# Patient Record
(demographics unavailable — no encounter records)

---

## 2025-01-18 NOTE — DISCUSSION/SUMMARY

## 2025-01-18 NOTE — END OF VISIT
[FreeTextEntry3] : Documented by Saadia Thacker acting as a scribe for Marleni Woods on 01/15/2025   All medical record entries made by the Scribe were at my, Dr. Marleni Woods direction and personally dictated by me on 01/15/2025 . I have reviewed the chart and agree that the record accurately reflects my personal performance of the history, physical exam, assessment and plan. I have also personally directed, reviewed, and agreed with the chart. [Time Spent: ___ minutes] : I have spent [unfilled] minutes of time on the encounter which excludes teaching and separately reported services.

## 2025-01-18 NOTE — PHYSICAL EXAM
[de-identified] : General: Well-nourished, well-developed, alert, and in no acute distress.  Head: Normocephalic.  Eyes: Pupils equal, extraocular muscles intact, normal sclera.  Nose: No nasal discharge.  Cardiovascular: Extremities are warm and well-perfused. Distal pulses are symmetric bilaterally.  Respiratory: No labored breathing.  Extremities: Sensation is intact distally bilaterally. Distal pulses are symmetric bilaterally  Lymphatic: No regional lymphadenopathy, no lymphedema  Neurologic: No focal deficits  Skin: Normal skin color, texture, and turgor  Psychiatric: Normal affect  MSK: Examination of [right] knee:   Gait [non-antalgic] No pain with double leg squat Valgus moment with single leg squat No effusion No erythema, hematoma or skin lesion Tender to palpation: patellar tendon Nontender to palpation: medial joint line, lateral joint line, medial patellar facet, lateral patellar facet, quad tendon, pes, Gerdy's tubercle, tibial tuberosity, popliteal fossa, hamstrings, ITB No warmth No Baker's cyst palpable ROM: 0-[120] [No] patellar crepitus   Log roll negative Lachman negative Anterior drawer negative Posterior drawer negative Varus/valgus stress negative at 0 and 30 deg Elton negative Patellar grind negative Thessaly negative   Examination of [left] knee:   No effusion, erythema, hematoma or skin lesion Tender to palpation: hamstrings Nontender to palpation: medial joint line, lateral joint line, medial patellar facet, lateral patellar facet, quad tendon, patellar tendon, pes, Gerdy's tubercle, tibial tuberosity, popliteal fossa, ITB No warmth No Baker's cyst palpable ROM: 0-120 [No] patellar crepitus   Log roll negative Lachman negative Anterior drawer negative Posterior drawer negative Varus/valgus stress negative at 0 and 30 deg Elton negative Patellar grind negative Thessaly negative   Sensation is intact to light touch over the superficial and deep peroneal nerve distributions and the posterior tibial nerve distribution. Capillary refill is less than two seconds. Posterior tibial and dorsalis pedis pulses 2+ equal bilaterally. No calf swelling or tenderness bilaterally. Strength testing shows hip flexion 5/5, hip adduction 5/5, hip abduction 5/5, knee extension 5/5, knee flexion 5/5, dorsiflexion 5/5, plantar flexion 5/5, EHL 5/5 Reflexes: Patellar 2+, Achilles 2+  [de-identified] : X-RAY RIGHT KNEE obtained at [facility] on 12/02/2024 was reviewed with the patient demonstrating: Unremarkable radiographs.  There is no evidence of fracture or dislocation.  The joint spaces are maintained.  Patellar alignment is normal.

## 2025-01-18 NOTE — HISTORY OF PRESENT ILLNESS
[de-identified] : DIMITRIS DAILY is a 21-year-old male with bilateral knee pain. Right knee has greater pain, that started 1 year ago, without any injuries. Pain is around the patella on the right knee. The patient suspects tendinitis in the right knee. The patient has not undergone physical therapy but has had an x-ray performed. He used ice, exercises, and rest to help treat the pain. Left knee he has posterior pain when playing soccer, the knee gives out. The pain fluctuates in intensity. The patient reports pain behind the left knee, which started after a muscle strain in the quadriceps approximately a year and a half ago. The patient is unsure if the muscle healed properly. He denies any numbness or clicking. The patient reports a recent sinus infection resolved with antibiotics two weeks ago. 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 The patient plays soccer and exercises with kettlebells but avoids squats.

## 2025-01-18 NOTE — HISTORY OF PRESENT ILLNESS
[de-identified] : DIMITRIS DAILY is a 21-year-old male with bilateral knee pain. Right knee has greater pain, that started 1 year ago, without any injuries. Pain is around the patella on the right knee. The patient suspects tendinitis in the right knee. The patient has not undergone physical therapy but has had an x-ray performed. He used ice, exercises, and rest to help treat the pain. Left knee he has posterior pain when playing soccer, the knee gives out. The pain fluctuates in intensity. The patient reports pain behind the left knee, which started after a muscle strain in the quadriceps approximately a year and a half ago. The patient is unsure if the muscle healed properly. He denies any numbness or clicking. The patient reports a recent sinus infection resolved with antibiotics two weeks ago. 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 The patient plays soccer and exercises with kettlebells but avoids squats.

## 2025-01-18 NOTE — ADDENDUM
[FreeTextEntry1] : I, Saadia Thacker ([facility]) assisted in filling out this chart under the dictation of Marleni Woods on 01/15/2025 .

## 2025-01-18 NOTE — DISCUSSION/SUMMARY

## 2025-01-18 NOTE — ASSESSMENT
[FreeTextEntry1] : DIMITRIS DAILY is a 21-year-old male with bilateral knee pain. I discussed with the patient that their symptoms, signs, and imaging are most consistent with right patellar tendonitis and left biceps femoris tendonitis. We reviewed the natural history of this condition and treatment options. We agreed on the following plan:    XR reviewed with the patient today. Activity modification: low-impact aerobic activity (stationary bike, elliptical, swimming) Recommend 150 min per week of moderate-intensity aerobic activity. Gradual return to soccer as symptoms permit. Start Home Exercises for patellar tendon and hamstring conditioning. Demonstration and handout provided. Start physical therapy. Referral provided. Medication: Diclofenac gel as needed. Examples of patellar tendon brace/strap. Advanced imaging: consider MRI if no symptomatic improvement Follow up in 6-8 weeks.

## 2025-01-18 NOTE — ADDENDUM
[FreeTextEntry1] : I, Saadia Thacker ([facility]) assisted in filling out this chart under the dictation of Marleni Woods on 01/15/2025 .

## 2025-01-18 NOTE — PHYSICAL EXAM
[de-identified] : General: Well-nourished, well-developed, alert, and in no acute distress.  Head: Normocephalic.  Eyes: Pupils equal, extraocular muscles intact, normal sclera.  Nose: No nasal discharge.  Cardiovascular: Extremities are warm and well-perfused. Distal pulses are symmetric bilaterally.  Respiratory: No labored breathing.  Extremities: Sensation is intact distally bilaterally. Distal pulses are symmetric bilaterally  Lymphatic: No regional lymphadenopathy, no lymphedema  Neurologic: No focal deficits  Skin: Normal skin color, texture, and turgor  Psychiatric: Normal affect  MSK: Examination of [right] knee:   Gait [non-antalgic] No pain with double leg squat Valgus moment with single leg squat No effusion No erythema, hematoma or skin lesion Tender to palpation: patellar tendon Nontender to palpation: medial joint line, lateral joint line, medial patellar facet, lateral patellar facet, quad tendon, pes, Gerdy's tubercle, tibial tuberosity, popliteal fossa, hamstrings, ITB No warmth No Baker's cyst palpable ROM: 0-[120] [No] patellar crepitus   Log roll negative Lachman negative Anterior drawer negative Posterior drawer negative Varus/valgus stress negative at 0 and 30 deg Elton negative Patellar grind negative Thessaly negative   Examination of [left] knee:   No effusion, erythema, hematoma or skin lesion Tender to palpation: hamstrings Nontender to palpation: medial joint line, lateral joint line, medial patellar facet, lateral patellar facet, quad tendon, patellar tendon, pes, Gerdy's tubercle, tibial tuberosity, popliteal fossa, ITB No warmth No Baker's cyst palpable ROM: 0-120 [No] patellar crepitus   Log roll negative Lachman negative Anterior drawer negative Posterior drawer negative Varus/valgus stress negative at 0 and 30 deg Elton negative Patellar grind negative Thessaly negative   Sensation is intact to light touch over the superficial and deep peroneal nerve distributions and the posterior tibial nerve distribution. Capillary refill is less than two seconds. Posterior tibial and dorsalis pedis pulses 2+ equal bilaterally. No calf swelling or tenderness bilaterally. Strength testing shows hip flexion 5/5, hip adduction 5/5, hip abduction 5/5, knee extension 5/5, knee flexion 5/5, dorsiflexion 5/5, plantar flexion 5/5, EHL 5/5 Reflexes: Patellar 2+, Achilles 2+  [de-identified] : X-RAY RIGHT KNEE obtained at [facility] on 12/02/2024 was reviewed with the patient demonstrating: Unremarkable radiographs.  There is no evidence of fracture or dislocation.  The joint spaces are maintained.  Patellar alignment is normal.

## 2025-02-27 NOTE — ADDENDUM
[FreeTextEntry1] : I, Saadia Thacker ([facility]) assisted in filling out this chart under the dictation of Marleni Woods on 02/26/2025 .

## 2025-02-27 NOTE — DISCUSSION/SUMMARY

## 2025-02-27 NOTE — DISCUSSION/SUMMARY

## 2025-02-27 NOTE — END OF VISIT
[FreeTextEntry3] : Documented by Saadia Thacker acting as a scribe for Marleni Woods on 02/26/2025   All medical record entries made by the Scribe were at my, Dr. Marleni Woods direction and personally dictated by me on 02/26/2025 . I have reviewed the chart and agree that the record accurately reflects my personal performance of the history, physical exam, assessment and plan. I have also personally directed, reviewed, and agreed with the chart. [Time Spent: ___ minutes] : I have spent [unfilled] minutes of time on the encounter which excludes teaching and separately reported services.

## 2025-02-27 NOTE — HISTORY OF PRESENT ILLNESS
[de-identified] : DIMITRIS DAILY is a 21-year-old male who presents for a follow-up of bilateral knee pain. Last visit was on 01/15/2025. He is still experiencing pain. He completed 13 sessions of PT to date over approximately four weeks and continues to perform exercises independently. The patient reports experiencing more pain in the right knee, particularly in the patellar tendon area. The patient has been wearing a patellar strap, which has helped improve symptoms during running activities. However, the patient feels that the knee is not fully healed. The patient also experiences pain in the back of the left knee, associated with the hamstring. The patient has been performing exercises at home, but there is minimal symptomatic improvement. The patient inquires about more imaging being done.

## 2025-02-27 NOTE — PHYSICAL EXAM
[de-identified] : General: Well-nourished, well-developed, alert, and in no acute distress. Head: Normocephalic. Eyes: Pupils equal, extraocular muscles intact, normal sclera. Nose: No nasal discharge. Cardiovascular: Extremities are warm and well perfused. Respiratory: No labored breathing. Extremities: Sensation is intact distally bilaterally.  Lymphatic: No regional lymphadenopathy, no lymphedema Neurologic: No focal deficits Skin: Normal skin color, texture, and turgor Psychiatric: Normal affect  MSK: Examination of [right] knee:  Gait [non-antalgic] No pain with double leg squat  No effusion No erythema, hematoma or skin lesion Tender to palpation: patellar tendon, ITB, Gerdy's tubercle Nontender to palpation: medial joint line, lateral joint line, medial patellar facet, lateral patellar facet, quad tendon, pes, tibial tuberosity, popliteal fossa, hamstrings  No warmth No Baker's cyst palpable ROM: 0-[120] [No] patellar crepitus  Log roll negative Lachman negative Anterior drawer negative Posterior drawer negative Varus/valgus stress negative at 0 and 30 deg Elton negative Patellar grind negative Noble negative  Examination of [left] knee:  No effusion, erythema, hematoma or skin lesion Tender to palpation: biceps femoris tendon Nontender to palpation: medial joint line, lateral joint line, medial patellar facet, lateral patellar facet, quad tendon, patellar tendon, pes, Gerdy's tubercle, tibial tuberosity, popliteal fossa, ITB No warmth No Baker's cyst palpable ROM: 0-120 [No] patellar crepitus  Log roll negative Lachman negative Anterior drawer negative Posterior drawer negative Varus/valgus stress negative at 0 and 30 deg Elton negative Patellar grind negative   Sensation is intact to light touch over the superficial and deep peroneal nerve distributions and the posterior tibial nerve distribution. Capillary refill is less than two seconds. Posterior tibial and dorsalis pedis pulses 2+ equal bilaterally. No calf swelling or tenderness bilaterally. Strength testing shows hip flexion 5/5, hip adduction 5/5, hip abduction 5/5, knee extension 5/5, knee flexion 5/5, dorsiflexion 5/5, plantar flexion 5/5, EHL 5/5 Reflexes: Patellar 2+, Achilles 2+

## 2025-02-27 NOTE — PHYSICAL EXAM
[de-identified] : General: Well-nourished, well-developed, alert, and in no acute distress. Head: Normocephalic. Eyes: Pupils equal, extraocular muscles intact, normal sclera. Nose: No nasal discharge. Cardiovascular: Extremities are warm and well perfused. Respiratory: No labored breathing. Extremities: Sensation is intact distally bilaterally.  Lymphatic: No regional lymphadenopathy, no lymphedema Neurologic: No focal deficits Skin: Normal skin color, texture, and turgor Psychiatric: Normal affect  MSK: Examination of [right] knee:  Gait [non-antalgic] No pain with double leg squat  No effusion No erythema, hematoma or skin lesion Tender to palpation: patellar tendon, ITB, Gerdy's tubercle Nontender to palpation: medial joint line, lateral joint line, medial patellar facet, lateral patellar facet, quad tendon, pes, tibial tuberosity, popliteal fossa, hamstrings  No warmth No Baker's cyst palpable ROM: 0-[120] [No] patellar crepitus  Log roll negative Lachman negative Anterior drawer negative Posterior drawer negative Varus/valgus stress negative at 0 and 30 deg Elton negative Patellar grind negative Noble negative  Examination of [left] knee:  No effusion, erythema, hematoma or skin lesion Tender to palpation: biceps femoris tendon Nontender to palpation: medial joint line, lateral joint line, medial patellar facet, lateral patellar facet, quad tendon, patellar tendon, pes, Gerdy's tubercle, tibial tuberosity, popliteal fossa, ITB No warmth No Baker's cyst palpable ROM: 0-120 [No] patellar crepitus  Log roll negative Lachman negative Anterior drawer negative Posterior drawer negative Varus/valgus stress negative at 0 and 30 deg Elton negative Patellar grind negative   Sensation is intact to light touch over the superficial and deep peroneal nerve distributions and the posterior tibial nerve distribution. Capillary refill is less than two seconds. Posterior tibial and dorsalis pedis pulses 2+ equal bilaterally. No calf swelling or tenderness bilaterally. Strength testing shows hip flexion 5/5, hip adduction 5/5, hip abduction 5/5, knee extension 5/5, knee flexion 5/5, dorsiflexion 5/5, plantar flexion 5/5, EHL 5/5 Reflexes: Patellar 2+, Achilles 2+

## 2025-02-27 NOTE — HISTORY OF PRESENT ILLNESS
[de-identified] : DIMITRIS DAILY is a 21-year-old male who presents for a follow-up of bilateral knee pain. Last visit was on 01/15/2025. He is still experiencing pain. He completed 13 sessions of PT to date over approximately four weeks and continues to perform exercises independently. The patient reports experiencing more pain in the right knee, particularly in the patellar tendon area. The patient has been wearing a patellar strap, which has helped improve symptoms during running activities. However, the patient feels that the knee is not fully healed. The patient also experiences pain in the back of the left knee, associated with the hamstring. The patient has been performing exercises at home, but there is minimal symptomatic improvement. The patient inquires about more imaging being done.

## 2025-02-27 NOTE — ADDENDUM
[FreeTextEntry1] : I, Saadia Thacker ([facility]) assisted in filling out this chart under the dictation of Marleni Woods on 02/26/2025 .

## 2025-03-20 NOTE — DISCUSSION/SUMMARY

## 2025-03-20 NOTE — END OF VISIT
[Time Spent: ___ minutes] : I have spent [unfilled] minutes of time on the encounter which excludes teaching and separately reported services. [FreeTextEntry3] : Documented by Saadia Thacker acting as a scribe for Marleni Woods on 03/17/2025   All medical record entries made by the Scribe were at my, Dr. Marleni Woods direction and personally dictated by me on 03/17/2025 . I have reviewed the chart and agree that the record accurately reflects my personal performance of the history, physical exam, assessment and plan. I have also personally directed, reviewed, and agreed with the chart.

## 2025-03-20 NOTE — PHYSICAL EXAM
[de-identified] : General: Well-nourished, well-developed, alert, and in no acute distress. Head: Normocephalic. Eyes: Pupils equal, extraocular muscles intact, normal sclera. Nose: No nasal discharge. Cardiovascular: Extremities are warm and well perfused. Respiratory: No labored breathing. Extremities: Sensation is intact distally bilaterally.  Lymphatic: No regional lymphadenopathy, no lymphedema Neurologic: No focal deficits Skin: Normal skin color, texture, and turgor Psychiatric: Normal affect   .MSK: Examination of [right] knee:  Gait [non-antalgic]  No effusion No erythema, hematoma or skin lesion Tender to palpation: patellar tendon Nontender to palpation: medial joint line, lateral joint line, medial patellar facet, lateral patellar facet, quad tendon, pes, tibial tuberosity, popliteal fossa, hamstrings, ITB, Gerdy's tubercle No warmth No Baker's cyst palpable ROM: 0-[120] [No] patellar crepitus  Log roll negative Lachman negative Anterior drawer negative Posterior drawer negative Varus/valgus stress negative at 0 and 30 deg Elton negative  Examination of [left] knee:  No effusion, erythema, hematoma or skin lesion Tender to palpation: patellar tendon, biceps femoris tendon Nontender to palpation: medial joint line, lateral joint line, medial patellar facet, lateral patellar facet, quad tendon, patellar tendon, pes, Gerdy's tubercle, tibial tuberosity, popliteal fossa, ITB No warmth No Baker's cyst palpable ROM: 0-120 [No] patellar crepitus  Log roll negative Lachman negative Anterior drawer negative Posterior drawer negative Varus/valgus stress negative at 0 and 30 deg Elton negative   [de-identified] : MR Knee No Cont, Right obtained at [facility] on 03/10/2025 was reviewed with the patient demonstratin.  Mild signal abnormality at the patellar tendon origin, findings consistent with patellar tendinosis.  2.  Findings of both Hoffa's fat pad impingement and quadriceps fat pad impingement.  3.  Patchy patellar edema without discrete overlying chondral fissure/defect, may represent marrow contusion, especially if there has been recent injury.  4.  Medial meniscus intrasubstance degeneration versus MRI artifact. No tear.  MR Knee No Cont, Left obtained at [facility] on 03/10/2025 was reviewed with the patient demonstratin.  Extensive marrow contusion of medial tibial plateau. Other areas of mild/subtle marrow contusion within the patella and medial femoral condyle.  2.  Findings of both Hoffa's fat pad impingement and quadriceps fat pad impingement.

## 2025-03-20 NOTE — ADDENDUM
[FreeTextEntry1] : I, Saadia Thacker ([facility]) assisted in filling out this chart under the dictation of Marleni Woods on 03/17/2025 .

## 2025-03-20 NOTE — PHYSICAL EXAM
[de-identified] : General: Well-nourished, well-developed, alert, and in no acute distress. Head: Normocephalic. Eyes: Pupils equal, extraocular muscles intact, normal sclera. Nose: No nasal discharge. Cardiovascular: Extremities are warm and well perfused. Respiratory: No labored breathing. Extremities: Sensation is intact distally bilaterally.  Lymphatic: No regional lymphadenopathy, no lymphedema Neurologic: No focal deficits Skin: Normal skin color, texture, and turgor Psychiatric: Normal affect   .MSK: Examination of [right] knee:  Gait [non-antalgic]  No effusion No erythema, hematoma or skin lesion Tender to palpation: patellar tendon Nontender to palpation: medial joint line, lateral joint line, medial patellar facet, lateral patellar facet, quad tendon, pes, tibial tuberosity, popliteal fossa, hamstrings, ITB, Gerdy's tubercle No warmth No Baker's cyst palpable ROM: 0-[120] [No] patellar crepitus  Log roll negative Lachman negative Anterior drawer negative Posterior drawer negative Varus/valgus stress negative at 0 and 30 deg Elton negative  Examination of [left] knee:  No effusion, erythema, hematoma or skin lesion Tender to palpation: patellar tendon, biceps femoris tendon Nontender to palpation: medial joint line, lateral joint line, medial patellar facet, lateral patellar facet, quad tendon, patellar tendon, pes, Gerdy's tubercle, tibial tuberosity, popliteal fossa, ITB No warmth No Baker's cyst palpable ROM: 0-120 [No] patellar crepitus  Log roll negative Lachman negative Anterior drawer negative Posterior drawer negative Varus/valgus stress negative at 0 and 30 deg Elton negative   [de-identified] : MR Knee No Cont, Right obtained at [facility] on 03/10/2025 was reviewed with the patient demonstratin.  Mild signal abnormality at the patellar tendon origin, findings consistent with patellar tendinosis.  2.  Findings of both Hoffa's fat pad impingement and quadriceps fat pad impingement.  3.  Patchy patellar edema without discrete overlying chondral fissure/defect, may represent marrow contusion, especially if there has been recent injury.  4.  Medial meniscus intrasubstance degeneration versus MRI artifact. No tear.  MR Knee No Cont, Left obtained at [facility] on 03/10/2025 was reviewed with the patient demonstratin.  Extensive marrow contusion of medial tibial plateau. Other areas of mild/subtle marrow contusion within the patella and medial femoral condyle.  2.  Findings of both Hoffa's fat pad impingement and quadriceps fat pad impingement.

## 2025-03-20 NOTE — HISTORY OF PRESENT ILLNESS
[de-identified] : DIMITRIS DAILY is a 21-year-old male who presents for a follow-up of knee pain. Last visit was on 02/26/2025. The patient reports increased knee pain since the start of the season, approximately one month ago. The pain is localized around the patellar tendon. The patient has been engaging in training activities. An MRI was performed, and the results were reviewed with the patient today.

## 2025-03-20 NOTE — DISCUSSION/SUMMARY

## 2025-03-20 NOTE — ADDENDUM
[FreeTextEntry1] : I, Saadia Thacker ([facility]) assisted in filling out this chart under the dictation of Marleni Woods on 03/17/2025 .

## 2025-03-20 NOTE — DISCUSSION/SUMMARY

## 2025-03-20 NOTE — HISTORY OF PRESENT ILLNESS
[de-identified] : DIMITRIS DAILY is a 21-year-old male who presents for a follow-up of knee pain. Last visit was on 02/26/2025. The patient reports increased knee pain since the start of the season, approximately one month ago. The pain is localized around the patellar tendon. The patient has been engaging in training activities. An MRI was performed, and the results were reviewed with the patient today.

## 2025-03-20 NOTE — PHYSICAL EXAM
[de-identified] : General: Well-nourished, well-developed, alert, and in no acute distress. Head: Normocephalic. Eyes: Pupils equal, extraocular muscles intact, normal sclera. Nose: No nasal discharge. Cardiovascular: Extremities are warm and well perfused. Respiratory: No labored breathing. Extremities: Sensation is intact distally bilaterally.  Lymphatic: No regional lymphadenopathy, no lymphedema Neurologic: No focal deficits Skin: Normal skin color, texture, and turgor Psychiatric: Normal affect   .MSK: Examination of [right] knee:  Gait [non-antalgic]  No effusion No erythema, hematoma or skin lesion Tender to palpation: patellar tendon Nontender to palpation: medial joint line, lateral joint line, medial patellar facet, lateral patellar facet, quad tendon, pes, tibial tuberosity, popliteal fossa, hamstrings, ITB, Gerdy's tubercle No warmth No Baker's cyst palpable ROM: 0-[120] [No] patellar crepitus  Log roll negative Lachman negative Anterior drawer negative Posterior drawer negative Varus/valgus stress negative at 0 and 30 deg Elton negative  Examination of [left] knee:  No effusion, erythema, hematoma or skin lesion Tender to palpation: patellar tendon, biceps femoris tendon Nontender to palpation: medial joint line, lateral joint line, medial patellar facet, lateral patellar facet, quad tendon, patellar tendon, pes, Gerdy's tubercle, tibial tuberosity, popliteal fossa, ITB No warmth No Baker's cyst palpable ROM: 0-120 [No] patellar crepitus  Log roll negative Lachman negative Anterior drawer negative Posterior drawer negative Varus/valgus stress negative at 0 and 30 deg Elton negative   [de-identified] : MR Knee No Cont, Right obtained at [facility] on 03/10/2025 was reviewed with the patient demonstratin.  Mild signal abnormality at the patellar tendon origin, findings consistent with patellar tendinosis.  2.  Findings of both Hoffa's fat pad impingement and quadriceps fat pad impingement.  3.  Patchy patellar edema without discrete overlying chondral fissure/defect, may represent marrow contusion, especially if there has been recent injury.  4.  Medial meniscus intrasubstance degeneration versus MRI artifact. No tear.  MR Knee No Cont, Left obtained at [facility] on 03/10/2025 was reviewed with the patient demonstratin.  Extensive marrow contusion of medial tibial plateau. Other areas of mild/subtle marrow contusion within the patella and medial femoral condyle.  2.  Findings of both Hoffa's fat pad impingement and quadriceps fat pad impingement.

## 2025-03-20 NOTE — HISTORY OF PRESENT ILLNESS
[de-identified] : DIMITRIS DAILY is a 21-year-old male who presents for a follow-up of knee pain. Last visit was on 02/26/2025. The patient reports increased knee pain since the start of the season, approximately one month ago. The pain is localized around the patellar tendon. The patient has been engaging in training activities. An MRI was performed, and the results were reviewed with the patient today.

## 2025-03-20 NOTE — ASSESSMENT
[FreeTextEntry1] : DIMITRIS DAILY is a 21-year-old male with bilateral knee pain.     I discussed with the patient that their symptoms, signs, and imaging are most consistent with patellar tendonitis, fat pad impingement, and bone contusion.  We reviewed the natural history of this condition and treatment options. We agreed on the following plan:   MRI of the right and left knee were reviewed at length with the patient today. Copies of the MRI reports were provided to the patient to share with his  at [facility]. Encouraged to continue home exercises per handout. Patient can return to unrestricted physical activity. Follow up as needed.

## 2025-03-20 NOTE — ASSESSMENT
[FreeTextEntry1] : DIMITRIS DAILY is a 21-year-old male with bilateral knee pain.     I discussed with the patient that their symptoms, signs, and imaging are most consistent with patellar tendonitis, fat pad impingement, and bone contusion.  We reviewed the natural history of this condition and treatment options. We agreed on the following plan:   MRI of the right and left knee were reviewed at length with the patient today. Copies of the MRI reports were provided to the patient to share with his  at [facility]. Encouraged to continue home exercises per handout. Patient can return to unrestricted physical activity. Follow up as needed.

## 2025-03-20 NOTE — ADDENDUM
[FreeTextEntry1] : I, Saadia Thacker ([facility]) assisted in filling out this chart under the dictation of Marleni Woods on 03/17/2025 .

## 2025-03-21 NOTE — HISTORY OF PRESENT ILLNESS
[de-identified] : DIMITRIS DAILY is a 21 year old male presents to follow up right knee pain. Last visit was 03/17/2025 at which time we reviewed MRI of left and right knees. Patient wanted clarification on some of the findings Patellar tendinosis, medial meniscus finding is likely an MRI artifact rather than true degeneration. Trace popliteal cyst is present but too small for intervention.

## 2025-03-21 NOTE — REASON FOR VISIT
[Follow-Up Visit] : a follow-up visit for [Home] : at home, [unfilled] , at the time of the visit. [Medical Office: ([facility])___] : at the medical office located in  [Telehealth (audio & video)] : This visit was provided via telehealth using real-time 2-way audio visual technology. [Verbal consent obtained from patient] : the patient, [unfilled] [FreeTextEntry2] : right knee

## 2025-03-21 NOTE — PHYSICAL EXAM
[de-identified] : MR Knee No Cont, Right obtained at [facility] on 03/10/2025 was reviewed with the patient demonstratin.  Mild signal abnormality at the patellar tendon origin, findings consistent with patellar tendinosis. 2.  Findings of both Hoffa's fat pad impingement and quadriceps fat pad impingement. 3.  Patchy patellar edema without discrete overlying chondral fissure/defect, may represent marrow contusion, especially if there has been recent injury. 4.  Medial meniscus intrasubstance degeneration versus MRI artifact. No tear.

## 2025-03-21 NOTE — PHYSICAL EXAM
[de-identified] : MR Knee No Cont, Right obtained at [facility] on 03/10/2025 was reviewed with the patient demonstratin.  Mild signal abnormality at the patellar tendon origin, findings consistent with patellar tendinosis. 2.  Findings of both Hoffa's fat pad impingement and quadriceps fat pad impingement. 3.  Patchy patellar edema without discrete overlying chondral fissure/defect, may represent marrow contusion, especially if there has been recent injury. 4.  Medial meniscus intrasubstance degeneration versus MRI artifact. No tear.

## 2025-03-21 NOTE — HISTORY OF PRESENT ILLNESS
[de-identified] : DIMITRIS DAILY is a 21 year old male presents to follow up right knee pain. Last visit was 03/17/2025 at which time we reviewed MRI of left and right knees. Patient wanted clarification on some of the findings Patellar tendinosis, medial meniscus finding is likely an MRI artifact rather than true degeneration. Trace popliteal cyst is present but too small for intervention.

## 2025-03-21 NOTE — PHYSICAL EXAM
[de-identified] : MR Knee No Cont, Right obtained at [facility] on 03/10/2025 was reviewed with the patient demonstratin.  Mild signal abnormality at the patellar tendon origin, findings consistent with patellar tendinosis. 2.  Findings of both Hoffa's fat pad impingement and quadriceps fat pad impingement. 3.  Patchy patellar edema without discrete overlying chondral fissure/defect, may represent marrow contusion, especially if there has been recent injury. 4.  Medial meniscus intrasubstance degeneration versus MRI artifact. No tear.

## 2025-03-21 NOTE — ASSESSMENT
[FreeTextEntry1] : DIMITRIS DAILY is a 21 year old male with bilateral knee pain.    I discussed with the patient that their symptoms, signs, and imaging are most consistent with patellar maltracking, fat pad impingement, patellar tendinosis, and patellar osteochondrosis.  We reviewed the natural history of this condition and treatment options. We agreed on the following plan:  Encouraged to continue home exercises per handout. Continue physical therapy. Recommend 150 min per week of moderate intensity aerobic activity  Medication: Diclofenac gel prn prescription provided. Encouraged continued use of patellar tendon strap and instructed on correct fit.  Follow up in 3 months.

## 2025-03-21 NOTE — HISTORY OF PRESENT ILLNESS
[de-identified] : DIMITRIS DAILY is a 21 year old male presents to follow up right knee pain. Last visit was 03/17/2025 at which time we reviewed MRI of left and right knees. Patient wanted clarification on some of the findings Patellar tendinosis, medial meniscus finding is likely an MRI artifact rather than true degeneration. Trace popliteal cyst is present but too small for intervention.

## 2025-03-21 NOTE — DISCUSSION/SUMMARY

## 2025-03-21 NOTE — DISCUSSION/SUMMARY

## 2025-03-21 NOTE — DISCUSSION/SUMMARY

## 2025-03-21 NOTE — REASON FOR VISIT
[Follow-Up Visit] : a follow-up visit for [Home] : at home, [unfilled] , at the time of the visit. [Medical Office: ([facility])___] : at the medical office located in  [Telehealth (audio & video)] : This visit was provided via telehealth using real-time 2-way audio visual technology. [Verbal consent obtained from patient] : the patient, [unfilled] [FreeTextEntry2] : right knee

## 2025-03-21 NOTE — REASON FOR VISIT
[Follow-Up Visit] : a follow-up visit for [Home] : at home, [unfilled] , at the time of the visit. [Medical Office: ([facility])___] : at the medical office located in  [Telehealth (audio & video)] : This visit was provided via telehealth using real-time 2-way audio visual technology. [Verbal consent obtained from patient] : the patient, [unfilled] [FreeTextEntry2] : right knee